# Patient Record
Sex: MALE | Race: WHITE | NOT HISPANIC OR LATINO | Employment: FULL TIME | URBAN - METROPOLITAN AREA
[De-identification: names, ages, dates, MRNs, and addresses within clinical notes are randomized per-mention and may not be internally consistent; named-entity substitution may affect disease eponyms.]

---

## 2022-01-07 ENCOUNTER — IMMUNIZATION (OUTPATIENT)
Dept: FAMILY MEDICINE | Facility: CLINIC | Age: 32
End: 2022-01-07
Payer: COMMERCIAL

## 2022-01-07 DIAGNOSIS — Z23 NEED FOR VACCINATION: Primary | ICD-10-CM

## 2022-01-07 PROCEDURE — 0004A COVID-19, MRNA, LNP-S, PF, 30 MCG/0.3 ML DOSE VACCINE: CPT | Mod: PBBFAC | Performed by: FAMILY MEDICINE

## 2023-02-24 ENCOUNTER — HOSPITAL ENCOUNTER (OUTPATIENT)
Dept: RADIOLOGY | Facility: HOSPITAL | Age: 33
Discharge: HOME | End: 2023-02-24
Attending: ORTHOPAEDIC SURGERY
Payer: COMMERCIAL

## 2023-02-24 ENCOUNTER — OFFICE VISIT (OUTPATIENT)
Dept: ORTHOPEDICS | Facility: CLINIC | Age: 33
End: 2023-02-24
Payer: COMMERCIAL

## 2023-02-24 DIAGNOSIS — M79.641 PAIN OF RIGHT HAND: ICD-10-CM

## 2023-02-24 DIAGNOSIS — M79.641 PAIN OF RIGHT HAND: Primary | ICD-10-CM

## 2023-02-24 DIAGNOSIS — S60.221A CONTUSION OF RIGHT HAND, INITIAL ENCOUNTER: ICD-10-CM

## 2023-02-24 PROCEDURE — 73130 XR HAND COMPLETE 3 VIEW RIGHT: ICD-10-PCS | Mod: 26,RT,, | Performed by: RADIOLOGY

## 2023-02-24 PROCEDURE — 73130 X-RAY EXAM OF HAND: CPT | Mod: TC,PO,RT

## 2023-02-24 PROCEDURE — 73110 X-RAY EXAM OF WRIST: CPT | Mod: TC,PO,RT

## 2023-02-24 PROCEDURE — 73110 X-RAY EXAM OF WRIST: CPT | Mod: 26,RT,, | Performed by: RADIOLOGY

## 2023-02-24 PROCEDURE — 99999 PR PBB SHADOW E&M-EST. PATIENT-LVL II: ICD-10-PCS | Mod: PBBFAC,,, | Performed by: ORTHOPAEDIC SURGERY

## 2023-02-24 PROCEDURE — 73110 XR WRIST COMPLETE 3 VIEWS RIGHT: ICD-10-PCS | Mod: 26,RT,, | Performed by: RADIOLOGY

## 2023-02-24 PROCEDURE — 73130 X-RAY EXAM OF HAND: CPT | Mod: 26,RT,, | Performed by: RADIOLOGY

## 2023-02-24 PROCEDURE — 99203 PR OFFICE/OUTPT VISIT, NEW, LEVL III, 30-44 MIN: ICD-10-PCS | Mod: S$GLB,,, | Performed by: ORTHOPAEDIC SURGERY

## 2023-02-24 PROCEDURE — 99203 OFFICE O/P NEW LOW 30 MIN: CPT | Mod: S$GLB,,, | Performed by: ORTHOPAEDIC SURGERY

## 2023-02-24 PROCEDURE — 99999 PR PBB SHADOW E&M-EST. PATIENT-LVL II: CPT | Mod: PBBFAC,,, | Performed by: ORTHOPAEDIC SURGERY

## 2023-02-24 NOTE — PROGRESS NOTES
Status/Diagnosis: Right 2nd/3rd metacarpal bony contusion  Date of Surgery: none  Date of Injury: 02/10/2023  Return visit: none  X-rays on Return: none    Chief Complaint:   Chief Complaint   Patient presents with    Right Hand - Pain     Present History:  Anthony F Lombard III is a 32 y.o. male who presents today for new patient evaluation.  Reports sustaining a crush injury to the right hand from a large wooden door on 02/10.  Immediate pain swelling.  Seen at local urgent care at which time x-rays were taken and read as negative.  Presents today due to persistent pain and swelling.  He is right-hand dominant.  Works as an .  Currently only complaining of we can  strength compared to pre-injury.  Denies any numbness or tingling.      No past medical history on file.    No past surgical history on file.    No current outpatient medications on file.     No current facility-administered medications for this visit.       Review of patient's allergies indicates:  No Known Allergies    No family history on file.    Social History     Socioeconomic History    Marital status:        Physical exam:  There were no vitals filed for this visit.  There is no height or weight on file to calculate BMI.  General: In no apparent distress; well developed and well nourished.  HEENT: normocephalic; atraumatic.  Cardiovascular: regular rate.  Respiratory: no increased work of breathing.  Musculoskeletal:   Inspection:  Moderate residual swelling and ecchymosis about the dorsum of the right hand, localized to the distal aspect of the 2nd and 3rd metacarpals.  Full painless 2nd and 3rd MCP range of motion.  Good extension strength.  Near full  strength, unable to obtain full  compared to the contralateral side limited mostly due to dorsal hand swelling.  Gross motor function intact.  Sensation intact to light touch radial, ulnar, median nerve distributions.  Palpable radial pulse.  Several small areas of  superficial abrasions that are nearly completely healed.                   Imaging Studies/Outside documentation:  I have ordered/reviewed/interpreted the following images/outside documentation:  1. Three views of the right hand and wrist:  No acute bony abnormality noted.  Joint space is well-maintained.        Assessment:  Anthony F Lombard III is a 32 y.o. male with Right 2nd/3rd metacarpal bony contusion.     Plan:   Clinical and radiographic findings were discussed.  Recommend conservative management to include rest, ice, compression, elevation.  Over-the-counter oral NSAIDs intermittently as needed for pain.  Patient voiced understanding.  All questions were answered.  He will follow up on an as-needed basis.    This note was created using voice recognition software and may contain grammatical errors.

## 2023-03-08 ENCOUNTER — TELEPHONE (OUTPATIENT)
Dept: ORTHOPEDICS | Facility: CLINIC | Age: 33
End: 2023-03-08
Payer: COMMERCIAL

## 2023-03-08 NOTE — LETTER
March 8, 2023      [facility]  [address] [place] [address]  Phone: [phone]       Patient: Anthony Anthony Lombard   YOB: 1990  Date of Visit: N/A    To Whom It May Concern:    Anthony Lombard  was at [facility] on 03/08/2023. The patient may return to work on 03/08/2023 with no restrictions. Boby may return to normal duties. If you have any questions or concerns, or if I can be of further assistance, please do not hesitate to contact me.    Sincerely,    Geraldo Chauhan M.D.

## 2023-03-08 NOTE — TELEPHONE ENCOUNTER
----- Message from Ashley Garcia sent at 3/8/2023  9:50 AM CST -----  Pt would like to be called back regarding  a letter for medical release to return to work     Pt can be reached at  [phone]

## 2024-01-26 ENCOUNTER — OFFICE VISIT (OUTPATIENT)
Dept: GASTROENTEROLOGY | Facility: CLINIC | Age: 34
End: 2024-01-26
Payer: COMMERCIAL

## 2024-01-26 ENCOUNTER — LAB VISIT (OUTPATIENT)
Dept: LAB | Facility: HOSPITAL | Age: 34
End: 2024-01-26
Attending: NURSE PRACTITIONER
Payer: COMMERCIAL

## 2024-01-26 VITALS — BODY MASS INDEX: 34.11 KG/M2 | HEIGHT: 71 IN | WEIGHT: 243.63 LBS

## 2024-01-26 DIAGNOSIS — R10.9 ABDOMINAL CRAMPING: ICD-10-CM

## 2024-01-26 DIAGNOSIS — R19.7 INTERMITTENT DIARRHEA: ICD-10-CM

## 2024-01-26 DIAGNOSIS — R19.7 INTERMITTENT DIARRHEA: Primary | ICD-10-CM

## 2024-01-26 DIAGNOSIS — Z83.79 FAMILY HISTORY OF CROHN'S DISEASE: ICD-10-CM

## 2024-01-26 DIAGNOSIS — Z80.0 FAMILY HISTORY OF GI TRACT CANCER: ICD-10-CM

## 2024-01-26 DIAGNOSIS — Z86.19 HISTORY OF CLOSTRIDIUM DIFFICILE INFECTION: ICD-10-CM

## 2024-01-26 LAB — IGA SERPL-MCNC: 214 MG/DL (ref 40–350)

## 2024-01-26 PROCEDURE — 99204 OFFICE O/P NEW MOD 45 MIN: CPT | Mod: S$GLB,,, | Performed by: NURSE PRACTITIONER

## 2024-01-26 PROCEDURE — 86364 TISS TRNSGLTMNASE EA IG CLAS: CPT | Performed by: NURSE PRACTITIONER

## 2024-01-26 PROCEDURE — 99999 PR PBB SHADOW E&M-EST. PATIENT-LVL III: CPT | Mod: PBBFAC,,, | Performed by: NURSE PRACTITIONER

## 2024-01-26 PROCEDURE — 82784 ASSAY IGA/IGD/IGG/IGM EACH: CPT | Performed by: NURSE PRACTITIONER

## 2024-01-26 PROCEDURE — 36415 COLL VENOUS BLD VENIPUNCTURE: CPT | Mod: PO | Performed by: NURSE PRACTITIONER

## 2024-01-26 RX ORDER — DICYCLOMINE HYDROCHLORIDE 10 MG/1
10 CAPSULE ORAL
Qty: 120 CAPSULE | Refills: 0 | Status: SHIPPED | OUTPATIENT
Start: 2024-01-26 | End: 2025-01-25

## 2024-01-26 NOTE — PATIENT INSTRUCTIONS
Uncertain Causes of Diarrhea (Adult)    Diarrhea is when stools are loose and watery. This can be caused by:  Viral infections  Bacterial infections  Food poisoning  Parasites  Irritable bowel syndrome (IBS)  Inflammatory bowel diseases such as ulcerative colitis, Crohn's disease, and celiac disease  Food intolerance, such as to lactose, the sugar found in milk and milk products  Reaction to medicines like antibiotics, laxatives, cancer drugs, and antacids  Along with diarrhea, you may also have:  Abdominal pain and cramping  Nausea and vomiting  Loss of bowel control  Fever and chills  Bloody stools  In some cases, antibiotics may help to treat diarrhea. You may have a stool sample test. This is done to see what is causing your diarrhea, and if antibiotics will help treat it. The results of a stool sample test may take up to 2 days. The healthcare provider may not give you antibiotics until he or she has the stool test results.  Diarrhea can cause dehydration. This is the loss of too much water and other fluids from the body. When this occurs, body fluid must be replaced. This can be done with oral rehydration solutions. Oral rehydration solutions are available at drugstores and grocery stores without a prescription.  Home care  Follow all instructions given by your healthcare provider. Rest at home for the next 24 hours, or until you feel better. Avoid caffeine, tobacco, and alcohol. These can make diarrhea, cramping, and pain worse.  If taking medicines:  Dont take over-the-counter diarrhea or nausea medicines unless your healthcare provider tells you to.  You may use acetaminophen or NSAID medicines like ibuprofen or naproxen to reduce pain and fever. Dont use these if you have chronic liver or kidney disease, or ever had a stomach ulcer or gastrointestinal bleeding. Don't use NSAID medicines if you are already taking one for another condition (like arthritis) or are on daily aspirin therapy (such as for heart  disease or after a stroke). Talk with your healthcare provider first.  If antibiotics were prescribed, be sure you take them until they are finished. Dont stop taking them even when you feel better. Antibiotics must be taken as a full course.  To prevent the spread of illness:  Remember that washing with soap and water and using alcohol-based  is the best way to prevent the spread of infection.  Clean the toilet after each use.  Wash your hands before eating.  Wash your hands before and after preparing food. Keep in mind that people with diarrhea or vomiting should not prepare food for others.  Wash your hands after using cutting boards, countertops, and knives that have been in contact with raw foods.  Wash and then peel fruits and vegetables.  Keep uncooked meats away from cooked and ready-to-eat foods.  Use a food thermometer when cooking. Cook poultry to at least 165°F (74°C). Cook ground meat (beef, veal, pork, lamb) to at least 160°F (71°C). Cook fresh beef, veal, lamb, and pork to at least 145°F (63°C).  Dont eat raw or undercooked eggs (poached or rob side up), poultry, meat, or unpasteurized milk and juices.  Food and drinks  The main goal while treating vomiting or diarrhea is to prevent dehydration. This is done by taking small amounts of liquids often.  Keep in mind that liquids are more important than food right now.  Drink only small amounts of liquids at a time.  Dont force yourself to eat, especially if you are having cramping, vomiting, or diarrhea. Dont eat large amounts at a time, even if you are hungry.  If you eat, avoid fatty, greasy, spicy, or fried foods.  Dont eat dairy foods or drink milk if you have diarrhea. These can make diarrhea worse.  During the first 24 hours you can try:  Oral rehydration solutions. Do not use sports drinks. They have too much sugar and not enough electrolytes.  Soft drinks without caffeine  Ginger ale  Water (plain or flavored)  Decaf tea or  coffee  Clear broth, consommé, or bouillon  Gelatin, popsicles, or frozen fruit juice bars  The second 24 hours, if you are feeling better, you can add:  Hot cereal, plain toast, bread, rolls, or crackers  Plain noodles, rice, mashed potatoes, chicken noodle soup, or rice soup  Unsweetened canned fruit (no pineapple)  Bananas  As you recover:  Limit fat intake to less than 15 grams per day. Dont eat margarine, butter, oils, mayonnaise, sauces, gravies, fried foods, peanut butter, meat, poultry, or fish.  Limit fiber. Dont eat raw or cooked vegetables, fresh fruits except bananas, or bran cereals.  Limit caffeine and chocolate.  Limit dairy.  Dont use spices or seasonings except salt.  Go back to your normal diet over time, as you feel better and your symptoms improve.  If the symptoms come back, go back to a simple diet or clear liquids.  Follow-up care  Follow up with your healthcare provider, or as advised. If a stool sample was taken or cultures were done, call the healthcare provider for the results as instructed.  Call 911  Call 911 if you have any of these symptoms:  Trouble breathing  Confusion  Extreme drowsiness or trouble walking  Loss of consciousness  Rapid heart rate  Chest pain  Stiff neck  Seizure  When to seek medical advice  Call your healthcare provider right away if any of these occur:  Abdominal pain that gets worse  Constant lower right abdominal pain  Continued vomiting and inability to keep liquids down  Diarrhea more than 5 times a day  Blood in vomit or stool  Dark urine or no urine for 8 hours, dry mouth and tongue, tiredness, weakness, or dizziness  Drowsiness  New rash  You dont get better in 2 to 3 days  Fever of 100.4°F (38°C) or higher that doesnt get lower with medicine  Date Last Reviewed: 1/3/2016  © 1376-0642 [employer]. [address] [place] [address], [place] [address]. All rights reserved. This information is not intended as a substitute for professional medical care.  Always follow your healthcare professional's instructions.

## 2024-01-26 NOTE — PROGRESS NOTES
Subjective:       Patient ID: Anthony F Lombard III is a 33 y.o. male Body mass index is 33.98 kg/m².    Chief Complaint: Diarrhea    This patient is new to me.  Referring Provider: Dr. Eva Romero for intermittent diarrhea and family history of crohn's.     Diarrhea   The current episode started more than 1 year ago (started ~10-15 years ago). Episode frequency: intermittent diarrhea, occurs ~2-3 times a week of 2-3 loose stools after eating something and then resolves; otherwise bowel movements are once daily of formed stool. The problem has been unchanged. The patient states that diarrhea does not awaken him from sleep. Associated symptoms include abdominal pain (had last night after eating at [employer] and he had to have a bowel movement; generalized abdominal described as pressure/cramping; denies currently; relieved after he had bowel movement). Pertinent negatives include no chills, coughing, fever, vomiting or weight loss. There are no known risk factors. Treatments tried: probiotic once daily.     Review of Systems   Constitutional:  Negative for appetite change, chills, fatigue, fever and weight loss.   HENT:  Negative for sore throat and trouble swallowing.    Respiratory:  Negative for cough, choking and shortness of breath.    Cardiovascular:  Negative for chest pain.   Gastrointestinal:  Positive for abdominal pain (had last night after eating at [employer] and he had to have a bowel movement; generalized abdominal described as pressure/cramping; denies currently; relieved after he had bowel movement) and diarrhea. Negative for anal bleeding, blood in stool, constipation, nausea, rectal pain and vomiting.   Genitourinary:  Negative for difficulty urinating, dysuria and flank pain.   Neurological:  Negative for weakness.       Past Medical History:   Diagnosis Date    C. difficile diarrhea 2011     Past Surgical History:   Procedure Laterality Date    APPENDECTOMY       Family History   Problem  "Relation Age of Onset    Cancer Mother         "last part of intestines" cancer    Crohn's disease Sister     Celiac disease Neg Hx      Social History     Tobacco Use    Smoking status: Former     Types: Cigarettes    Smokeless tobacco: Never   Substance Use Topics    Alcohol use: Yes     Comment: cut back 1/2024; has had maybe 12 beers since 1/1/2024    Drug use: Not Currently     Wt Readings from Last 10 Encounters:   01/26/24 110.5 kg (243 lb 9.7 oz)   01/19/24 109.9 kg (242 lb 4.8 oz)   07/09/09 72.3 kg (159 lb 5.2 oz) (76 %, Z= 0.70)*     * Growth percentiles are based on [facility] (Boys, 2-20 Years) data.     Lab Results   Component Value Date    WBC 8.41 01/20/2024    HGB 15.8 01/20/2024    HCT 47.6 01/20/2024    MCV 90 01/20/2024     01/20/2024     CMP  Sodium   Date Value Ref Range Status   01/20/2024 139 136 - 145 mmol/L Final     Potassium   Date Value Ref Range Status   01/20/2024 4.6 3.5 - 5.1 mmol/L Final     Comment:     Anion Gap reference range revised on 4/28/2023     Chloride   Date Value Ref Range Status   01/20/2024 103 95 - 110 mmol/L Final     CO2   Date Value Ref Range Status   01/20/2024 29 22 - 31 mmol/L Final     Glucose   Date Value Ref Range Status   01/20/2024 105 70 - 110 mg/dL Final     Comment:     The ADA recommends the following guidelines for fasting glucose:    Normal:       less than 100 mg/dL    Prediabetes:  100 mg/dL to 125 mg/dL    Diabetes:     126 mg/dL or higher       BUN   Date Value Ref Range Status   01/20/2024 16 9 - 21 mg/dL Final     Creatinine   Date Value Ref Range Status   01/20/2024 1.28 0.50 - 1.40 mg/dL Final     Calcium   Date Value Ref Range Status   01/20/2024 9.6 8.4 - 10.2 mg/dL Final     Total Protein   Date Value Ref Range Status   01/20/2024 7.0 6.0 - 8.4 g/dL Final     Albumin   Date Value Ref Range Status   01/20/2024 4.5 3.5 - 5.2 g/dL Final     Total Bilirubin   Date Value Ref Range Status   01/20/2024 0.7 0.2 - 1.3 mg/dL Final     Alkaline " Phosphatase   Date Value Ref Range Status   01/20/2024 60 38 - 145 U/L Final     AST   Date Value Ref Range Status   01/20/2024 31 17 - 59 U/L Final     ALT   Date Value Ref Range Status   01/20/2024 47 0 - 50 U/L Final     Anion Gap   Date Value Ref Range Status   01/20/2024 7 5 - 12 mmol/L Final     Comment:     Anion Gap reference range revised on 4/28/2023     Lab Results   Component Value Date    TSH 1.460 01/20/2024     Objective:      Physical Exam  Vitals and nursing note reviewed.   Constitutional:       General: He is not in acute distress.     Appearance: Normal appearance. He is well-developed. He is not diaphoretic.   HENT:      Mouth/Throat:      Lips: Pink. No lesions.      Mouth: Mucous membranes are moist. No oral lesions.      Tongue: No lesions.      Pharynx: Oropharynx is clear. No pharyngeal swelling or posterior oropharyngeal erythema.   Eyes:      General: No scleral icterus.     Conjunctiva/sclera: Conjunctivae normal.   Pulmonary:      Effort: Pulmonary effort is normal. No respiratory distress.      Breath sounds: Normal breath sounds. No wheezing.   Abdominal:      General: Bowel sounds are normal. There is no distension or abdominal bruit.      Palpations: Abdomen is soft. Abdomen is not rigid. There is no mass.      Tenderness: There is no abdominal tenderness. There is no guarding or rebound. Negative signs include Goff's sign and McBurney's sign.   Skin:     General: Skin is warm and dry.      Coloration: Skin is not jaundiced or pale.      Findings: No erythema or rash.   Neurological:      Mental Status: He is alert and oriented to person, place, and time.   Psychiatric:         Behavior: Behavior normal.         Thought Content: Thought content normal.         Judgment: Judgment normal.         Assessment:       1. Intermittent diarrhea    2. Abdominal cramping    3. Family history of Crohn's disease    4. Family history of GI tract cancer    5. History of Clostridium difficile  infection        Plan:       Intermittent diarrhea  -     Tissue Transglutaminase, IgA; Future; Expected date: 01/26/2024  -     IgA; Future; Expected date: 01/26/2024  -     Stool Exam-Ova,Cysts,Parasites; Future; Expected date: 01/26/2024  -     Fecal fat, qualitative; Future; Expected date: 01/26/2024  -     Giardia / Cryptosporidum, EIA; Future; Expected date: 01/26/2024  -     Rotavirus antigen, stool; Future; Expected date: 01/26/2024  -     WBC, Stool; Future; Expected date: 01/26/2024  -     Stool culture; Future; Expected date: 01/26/2024  -     Clostridium difficile EIA; Future; Expected date: 01/26/2024  -     Adenovirus Molecular Detection, PCR, Non-Blood Stool; Future; Expected date: 01/26/2024  -  START   dicyclomine (BENTYL) 10 MG capsule; Take 1 capsule (10 mg total) by mouth before meals and at bedtime as needed (abdominal cramping/pain).  Dispense: 120 capsule; Refill: 0  - schedule Colonoscopy, discussed procedure with the patient, including risks and benefits, patient verbalized understanding  - can continue OTC probiotic, Culturelle, taken as directed on packaging  - avoid lactose, alcohol, & caffeine  - avoid known triggers    Abdominal cramping  -  START   dicyclomine (BENTYL) 10 MG capsule; Take 1 capsule (10 mg total) by mouth before meals and at bedtime as needed (abdominal cramping/pain).  Dispense: 120 capsule; Refill: 0  - Possible abdominal imaging pending results of testing and if symptoms recur    Family history of Crohn's disease  - schedule Colonoscopy, discussed procedure with the patient, including risks and benefits, patient verbalized understanding    Family history of GI tract cancer    History of Clostridium difficile infection  -     Clostridium difficile EIA; Future; Expected date: 01/26/2024    Follow up in about 1 month (around 2/26/2024), or if symptoms worsen or fail to improve.      If no improvement in symptoms or symptoms worsen, call/follow-up at clinic or go to  ER.        23 minutes of total time spent on the encounter, which includes face to face time and non-face to face time preparing to see the patient (e.g., review of tests), Obtaining and/or reviewing separately obtained history, Documenting clinical information in the electronic or other health record, Independently interpreting results (not separately reported) and communicating results to the patient/family/caregiver, or Care coordination (not separately reported).

## 2024-01-27 ENCOUNTER — LAB VISIT (OUTPATIENT)
Dept: LAB | Facility: HOSPITAL | Age: 34
End: 2024-01-27
Attending: NURSE PRACTITIONER
Payer: COMMERCIAL

## 2024-01-27 DIAGNOSIS — Z86.19 HISTORY OF CLOSTRIDIUM DIFFICILE INFECTION: ICD-10-CM

## 2024-01-27 DIAGNOSIS — R19.7 INTERMITTENT DIARRHEA: ICD-10-CM

## 2024-01-27 LAB — WBC #/AREA STL HPF: NORMAL /[HPF]

## 2024-01-27 PROCEDURE — 87045 FECES CULTURE AEROBIC BACT: CPT | Performed by: NURSE PRACTITIONER

## 2024-01-27 PROCEDURE — 82705 FATS/LIPIDS FECES QUAL: CPT | Performed by: NURSE PRACTITIONER

## 2024-01-27 PROCEDURE — 89055 LEUKOCYTE ASSESSMENT FECAL: CPT | Performed by: NURSE PRACTITIONER

## 2024-01-27 PROCEDURE — 87329 GIARDIA AG IA: CPT | Performed by: NURSE PRACTITIONER

## 2024-01-27 PROCEDURE — 87425 ROTAVIRUS AG IA: CPT | Performed by: NURSE PRACTITIONER

## 2024-01-27 PROCEDURE — 87449 NOS EACH ORGANISM AG IA: CPT | Performed by: NURSE PRACTITIONER

## 2024-01-27 PROCEDURE — 87046 STOOL CULTR AEROBIC BACT EA: CPT | Performed by: NURSE PRACTITIONER

## 2024-01-27 PROCEDURE — 87449 NOS EACH ORGANISM AG IA: CPT | Mod: 91 | Performed by: NURSE PRACTITIONER

## 2024-01-27 PROCEDURE — 87427 SHIGA-LIKE TOXIN AG IA: CPT | Performed by: NURSE PRACTITIONER

## 2024-01-27 PROCEDURE — 87209 SMEAR COMPLEX STAIN: CPT | Performed by: NURSE PRACTITIONER

## 2024-01-28 LAB
C DIFF GDH STL QL: NEGATIVE
C DIFF TOX A+B STL QL IA: NEGATIVE
CRYPTOSP AG STL QL IA: NEGATIVE
G LAMBLIA AG STL QL IA: NEGATIVE

## 2024-01-29 LAB
BACTERIA STL CULT: NORMAL
E COLI SXT1 STL QL IA: NEGATIVE
E COLI SXT2 STL QL IA: NEGATIVE
RV AG STL QL IA.RAPID: NEGATIVE

## 2024-01-31 LAB
FAT STL QL: NORMAL
NEUTRAL FAT STL QL: NORMAL
O+P STL MICRO: NORMAL

## 2024-02-01 LAB — TTG IGA SER-ACNC: 0.9 U/ML

## 2024-04-08 ENCOUNTER — TELEPHONE (OUTPATIENT)
Dept: GASTROENTEROLOGY | Facility: CLINIC | Age: 34
End: 2024-04-08
Payer: COMMERCIAL

## 2024-04-08 NOTE — TELEPHONE ENCOUNTER
----- Message from Yasmeen Osborn sent at 4/8/2024  4:04 PM CDT -----  non-specific  Pt is unable to keep May 17 colonoscopy.  Please call back to advise  [phone]

## 2024-04-08 NOTE — TELEPHONE ENCOUNTER
Returned call to the patient, procedure rescheduled with the patient per his request, procedure rescheduled with the patient, patient verbalized understanding of this.

## 2024-07-01 ENCOUNTER — TELEPHONE (OUTPATIENT)
Dept: GASTROENTEROLOGY | Facility: CLINIC | Age: 34
End: 2024-07-01
Payer: COMMERCIAL

## 2024-07-01 NOTE — TELEPHONE ENCOUNTER
Attempted to reach the patient, left message that case was cancelled per his request, clinic number provided.

## 2024-07-01 NOTE — TELEPHONE ENCOUNTER
----- Message from Yasmeen Osborn sent at 7/1/2024  1:48 PM CDT -----  Pt is requesting to cancel the procedure next week   He will reach back out soon to reschedule